# Patient Record
Sex: MALE | Race: WHITE | NOT HISPANIC OR LATINO | Employment: OTHER | URBAN - METROPOLITAN AREA
[De-identification: names, ages, dates, MRNs, and addresses within clinical notes are randomized per-mention and may not be internally consistent; named-entity substitution may affect disease eponyms.]

---

## 2017-07-18 ENCOUNTER — PATIENT MESSAGE (OUTPATIENT)
Dept: FAMILY MEDICINE | Facility: CLINIC | Age: 62
End: 2017-07-18

## 2017-07-18 ENCOUNTER — OFFICE VISIT (OUTPATIENT)
Dept: FAMILY MEDICINE | Facility: CLINIC | Age: 62
End: 2017-07-18
Payer: COMMERCIAL

## 2017-07-18 ENCOUNTER — HOSPITAL ENCOUNTER (OUTPATIENT)
Dept: RADIOLOGY | Facility: HOSPITAL | Age: 62
Discharge: HOME | End: 2017-07-18
Attending: FAMILY MEDICINE

## 2017-07-18 ENCOUNTER — LAB VISIT (OUTPATIENT)
Dept: LAB | Facility: HOSPITAL | Age: 62
End: 2017-07-18
Attending: FAMILY MEDICINE
Payer: COMMERCIAL

## 2017-07-18 VITALS
TEMPERATURE: 98 F | WEIGHT: 141 LBS | HEIGHT: 65 IN | HEART RATE: 65 BPM | OXYGEN SATURATION: 98 % | DIASTOLIC BLOOD PRESSURE: 60 MMHG | SYSTOLIC BLOOD PRESSURE: 96 MMHG | BODY MASS INDEX: 23.49 KG/M2 | RESPIRATION RATE: 16 BRPM

## 2017-07-18 DIAGNOSIS — Z72.0 TOBACCO ABUSE: ICD-10-CM

## 2017-07-18 DIAGNOSIS — Z00.00 ROUTINE GENERAL MEDICAL EXAMINATION AT A HEALTH CARE FACILITY: ICD-10-CM

## 2017-07-18 DIAGNOSIS — Z23 NEED FOR TDAP VACCINATION: ICD-10-CM

## 2017-07-18 DIAGNOSIS — Z12.11 SCREENING FOR COLON CANCER: ICD-10-CM

## 2017-07-18 DIAGNOSIS — Z11.59 NEED FOR HEPATITIS C SCREENING TEST: ICD-10-CM

## 2017-07-18 DIAGNOSIS — Z00.00 ROUTINE GENERAL MEDICAL EXAMINATION AT A HEALTH CARE FACILITY: Primary | ICD-10-CM

## 2017-07-18 LAB
ALBUMIN SERPL BCP-MCNC: 3.8 G/DL
ALP SERPL-CCNC: 60 U/L
ALT SERPL W/O P-5'-P-CCNC: 20 U/L
ANION GAP SERPL CALC-SCNC: 4 MMOL/L
AST SERPL-CCNC: 29 U/L
BASOPHILS # BLD AUTO: 0.03 K/UL
BASOPHILS NFR BLD: 0.5 %
BILIRUB SERPL-MCNC: 0.5 MG/DL
BUN SERPL-MCNC: 13 MG/DL
CALCIUM SERPL-MCNC: 9.4 MG/DL
CHLORIDE SERPL-SCNC: 111 MMOL/L
CHOLEST/HDLC SERPL: 4.3 {RATIO}
CO2 SERPL-SCNC: 27 MMOL/L
CREAT SERPL-MCNC: 0.9 MG/DL
DIFFERENTIAL METHOD: ABNORMAL
EOSINOPHIL # BLD AUTO: 0.2 K/UL
EOSINOPHIL NFR BLD: 3.1 %
ERYTHROCYTE [DISTWIDTH] IN BLOOD BY AUTOMATED COUNT: 13.5 %
EST. GFR  (AFRICAN AMERICAN): >60 ML/MIN/1.73 M^2
EST. GFR  (NON AFRICAN AMERICAN): >60 ML/MIN/1.73 M^2
GLUCOSE SERPL-MCNC: 114 MG/DL
HCT VFR BLD AUTO: 42.9 %
HDL/CHOLESTEROL RATIO: 23.4 %
HDLC SERPL-MCNC: 175 MG/DL
HDLC SERPL-MCNC: 41 MG/DL
HGB BLD-MCNC: 14.5 G/DL
LDLC SERPL CALC-MCNC: 123.2 MG/DL
LYMPHOCYTES # BLD AUTO: 2 K/UL
LYMPHOCYTES NFR BLD: 33.8 %
MCH RBC QN AUTO: 32 PG
MCHC RBC AUTO-ENTMCNC: 33.8 %
MCV RBC AUTO: 95 FL
MONOCYTES # BLD AUTO: 0.4 K/UL
MONOCYTES NFR BLD: 7.3 %
NEUTROPHILS # BLD AUTO: 3.2 K/UL
NEUTROPHILS NFR BLD: 55.3 %
NONHDLC SERPL-MCNC: 134 MG/DL
PLATELET # BLD AUTO: 144 K/UL
PMV BLD AUTO: 11.6 FL
POTASSIUM SERPL-SCNC: 5.1 MMOL/L
PROT SERPL-MCNC: 7.6 G/DL
RBC # BLD AUTO: 4.53 M/UL
SODIUM SERPL-SCNC: 142 MMOL/L
TRIGL SERPL-MCNC: 54 MG/DL
WBC # BLD AUTO: 5.77 K/UL

## 2017-07-18 PROCEDURE — 86803 HEPATITIS C AB TEST: CPT

## 2017-07-18 PROCEDURE — 84153 ASSAY OF PSA TOTAL: CPT

## 2017-07-18 PROCEDURE — 80053 COMPREHEN METABOLIC PANEL: CPT

## 2017-07-18 PROCEDURE — 80061 LIPID PANEL: CPT

## 2017-07-18 PROCEDURE — 99386 PREV VISIT NEW AGE 40-64: CPT | Mod: S$GLB,,, | Performed by: FAMILY MEDICINE

## 2017-07-18 PROCEDURE — 76497 UNLISTED CT PROCEDURE: CPT | Mod: TC

## 2017-07-18 PROCEDURE — 83036 HEMOGLOBIN GLYCOSYLATED A1C: CPT

## 2017-07-18 PROCEDURE — 99999 PR PBB SHADOW E&M-NEW PATIENT-LVL IV: CPT | Mod: PBBFAC,,, | Performed by: FAMILY MEDICINE

## 2017-07-18 PROCEDURE — 85025 COMPLETE CBC W/AUTO DIFF WBC: CPT

## 2017-07-18 PROCEDURE — 36415 COLL VENOUS BLD VENIPUNCTURE: CPT | Mod: PN

## 2017-07-18 NOTE — PROGRESS NOTES
"Well Man VISIT      CHIEF COMPLAINT  Chief Complaint   Patient presents with    Annual Exam       HPI  Evan Son is a 62 y.o. male who presents well man.  He speaks some English.  Discussed his health with his daughter, Jairo over the phone.  He declines any acute symptoms today.  He works fulltime.     Lives with wife, at home.    Social Factors  Tobacco use: Yes.1 ppd for last 30 years.  1 ppdx30= 30 pack years   Ready to Quit: No  Intimate partner violence screening  "Do you feel safe in your current relationship?" Yes  "Have you ever been in a relationship in which your partner frightened you or hurt you?" No  Regular Exercise: Yes - walking    Depression  Over the past two weeks, have you felt down, depressed, or hopeless? No  Over the past two weeks, have you felt little interest or pleasure in doing things? No    Reproductive Health  STD screening in last year: no    Screen for Chronic Disease  CHD Risk Factors: advanced age (older than 55 for men, 65 for women), male gender and smoking/ tobacco exposure  Estimated body mass index is 23.83 kg/m² as calculated from the following:    Height as of this encounter: 5' 4.5" (1.638 m).    Weight as of this encounter: 64 kg (141 lb).  Dyslipidemia screening needed: yes  T2DM screening needed: yes  Colonoscopy needed: yes  PSA needed: yes  AAA screening needed:yes  Screen men 35 years and older, and men 20 to 34 years of age who have cardiovascular risk factors for dyslipidemia  Begin screening colonoscopies at 50 years of age in men of average risk, and continue until 75 years of age; offer fecal occult blood testing every year, flexible sigmoidoscopy every five years combined with fecal occult blood testing every three years, or colonoscopy every 10 years   The American Urological Association recommends offering PSA testing and digital rectal examination to well-informed men beginning at 40 years of age and continuing until life expectancy is less than 10 " "years  Screen once with ultrasonography in men 65 to 75 years of age if they have a family history or have smoked at [id number] cigarettes in their lifetime  Screen men with a sustained blood pressure greater than 135/80 mm Hg for T2DM      Immunizations  up to date and documented. Declines pneumovax and Tdap.  Declines all vaccinations.    ALLERGIES and MEDS were verified.   PMHx, PSHx, FHx, SOCIALHx were updated as pertinent.    REVIEW OF SYSTEMS  Negative 5 point review of systems    PHYSICAL EXAM  VITAL SIGNS: BP 96/60 (BP Location: Right arm, Patient Position: Sitting, BP Method: Manual)   Pulse 65   Temp 97.7 °F (36.5 °C) (Oral)   Resp 16   Ht 5' 4.5" (1.638 m)   Wt 64 kg (141 lb)   SpO2 98%   BMI 23.83 kg/m²   GEN: Well developed, Well nourished, No acute distress.  HENT: Normocephalic, Atraumatic, Bilateral external ears normal, Nose normal, Oropharynx moist, No oral exudates.   Eyes: PERRLA, EOMI, Conjunctiva normal, No discharge.   Neck: Supple, No tenderness.  Lymphatic: No cervical or supraclavicular lymphadenopathy noted.   Cardiovascular: Normal heart rate, Normal rhythm, No murmurs, No rubs, No gallops.   Thorax & Lungs: Normal breath sounds, No respiratory distress, No wheezing.  Abdomen: Soft, No tenderness, Bowel sounds normal.  Genital: normal, deferred   Skin: Warm, Dry, No erythema, No rash.   Extremities: No edema, No tenderness.       ASSESSMENT/PLAN    Evan was seen today for annual exam.    Diagnoses and all orders for this visit:    Routine general medical examination at a health care facility  -     Hemoglobin A1c; Future  -     CBC auto differential; Future  -     PSA, Screening; Future  -     Comprehensive metabolic panel; Future  -     Lipid panel; Future    Screening for colon cancer  -     Case request GI: COLONOSCOPY    Need for hepatitis C screening test  -     Hepatitis C antibody; Future    Tobacco abuse  -     CT Chest Lung Screening Low Dose; Future    FOLLOW UP: PRN for " acute symptoms  Patient told to reach out to us regarding tobacco cessation. Also given number for [phone] quit now.    Answers for HPI/ROS submitted by the patient on 7/18/2017   activity change: No  unexpected weight change: No  neck pain: No  hearing loss: No  rhinorrhea: No  trouble swallowing: No  eye discharge: No  visual disturbance: No  chest tightness: No  wheezing: No  chest pain: No  palpitations: No  blood in stool: No  constipation: No  vomiting: No  diarrhea: No  polydipsia: No  polyuria: No  difficulty urinating: No  urgency: No  hematuria: No  joint swelling: No  arthralgias: No  headaches: No  weakness: No  confusion: No  dysphoric mood: No

## 2017-07-19 ENCOUNTER — PATIENT MESSAGE (OUTPATIENT)
Dept: FAMILY MEDICINE | Facility: CLINIC | Age: 62
End: 2017-07-19

## 2017-07-19 LAB
COMPLEXED PSA SERPL-MCNC: 2.3 NG/ML
ESTIMATED AVG GLUCOSE: 120 MG/DL
HBA1C MFR BLD HPLC: 5.8 %
HCV AB SERPL QL IA: NEGATIVE

## 2017-09-18 ENCOUNTER — TELEPHONE (OUTPATIENT)
Dept: UROLOGY | Facility: CLINIC | Age: 62
End: 2017-09-18

## 2017-09-18 NOTE — TELEPHONE ENCOUNTER
Patient states he speaks limited english and would like to see dr. Miller only. He would not tell me the reason for the visit. perico't confirmed for 10.2.2017 and mailed

## 2017-09-18 NOTE — TELEPHONE ENCOUNTER
----- Message from Di Piper sent at 9/18/2017  2:02 PM CDT -----  Contact: Pt:[phone]  Pt called and states he would like to speak with . Pt did not disclose reason.

## 2017-10-02 ENCOUNTER — OFFICE VISIT (OUTPATIENT)
Dept: UROLOGY | Facility: CLINIC | Age: 62
End: 2017-10-02
Payer: COMMERCIAL

## 2017-10-02 VITALS
SYSTOLIC BLOOD PRESSURE: 107 MMHG | WEIGHT: 144.19 LBS | BODY MASS INDEX: 24.02 KG/M2 | HEIGHT: 65 IN | DIASTOLIC BLOOD PRESSURE: 68 MMHG | HEART RATE: 79 BPM

## 2017-10-02 DIAGNOSIS — R39.12 WEAK URINE STREAM: Primary | ICD-10-CM

## 2017-10-02 DIAGNOSIS — N13.8 BPH WITH URINARY OBSTRUCTION: ICD-10-CM

## 2017-10-02 DIAGNOSIS — N52.9 ED (ERECTILE DYSFUNCTION) OF ORGANIC ORIGIN: ICD-10-CM

## 2017-10-02 DIAGNOSIS — N41.1 CHRONIC PROSTATITIS: ICD-10-CM

## 2017-10-02 DIAGNOSIS — N40.1 BPH WITH URINARY OBSTRUCTION: ICD-10-CM

## 2017-10-02 PROCEDURE — 87086 URINE CULTURE/COLONY COUNT: CPT

## 2017-10-02 PROCEDURE — 99205 OFFICE O/P NEW HI 60 MIN: CPT | Mod: 25,S$GLB,, | Performed by: UROLOGY

## 2017-10-02 PROCEDURE — 3008F BODY MASS INDEX DOCD: CPT | Mod: S$GLB,,, | Performed by: UROLOGY

## 2017-10-02 PROCEDURE — 99999 PR PBB SHADOW E&M-EST. PATIENT-LVL III: CPT | Mod: PBBFAC,,, | Performed by: UROLOGY

## 2017-10-02 RX ORDER — SILDENAFIL 100 MG/1
100 TABLET, FILM COATED ORAL DAILY PRN
Qty: 6 TABLET | Refills: 12 | Status: SHIPPED | OUTPATIENT
Start: 2017-10-02 | End: 2018-03-08

## 2017-10-02 RX ORDER — CIPROFLOXACIN 250 MG/1
500 TABLET, FILM COATED ORAL ONCE
Status: CANCELLED | OUTPATIENT
Start: 2017-10-02 | End: 2017-10-02

## 2017-10-02 RX ORDER — LIDOCAINE HYDROCHLORIDE 20 MG/ML
JELLY TOPICAL ONCE
Status: CANCELLED | OUTPATIENT
Start: 2017-10-02 | End: 2017-10-02

## 2017-10-02 RX ORDER — CIPROFLOXACIN 500 MG/1
500 TABLET ORAL 2 TIMES DAILY
Qty: 60 TABLET | Refills: 1 | Status: SHIPPED | OUTPATIENT
Start: 2017-10-02 | End: 2017-11-01

## 2017-10-02 RX ORDER — SILODOSIN 8 MG/1
8 CAPSULE ORAL NIGHTLY
Qty: 30 CAPSULE | Refills: 12 | Status: SHIPPED | OUTPATIENT
Start: 2017-10-02 | End: 2018-02-16

## 2017-10-02 NOTE — PROGRESS NOTES
CC: voiding trouble    Evan Son is a 62 y.o. man who is here for the evaluation of No chief complaint on file.  a new pt.  He is a Spanish with limited English speaking.  Hx of prostatitis back in 2009.  Since then, he often has experienced spraying or splitting urine flow.  Has been taking Flomax for the past 2 years.  C/o intermittency, weak urine flow, incomplete bladder emptying.    No family hx of prostate cancer.  Denies flank pain, dysuria, hematuria .      No past medical history on file.  No past surgical history on file.  Social History   Substance Use Topics    Smoking status: Current Every Day Smoker     Packs/day: 0.75     Types: Cigarettes    Smokeless tobacco: Never Used    Alcohol use No     No family history on file.  Allergy:  Review of patient's allergies indicates:  No Known Allergies  Outpatient Encounter Prescriptions as of 10/2/2017   Medication Sig Dispense Refill    ciprofloxacin HCl (CIPRO) 500 MG tablet Take 1 tablet (500 mg total) by mouth 2 (two) times daily. 60 tablet 1    polyethylene glycol (COLYTE) 240-22.72-6.72 -5.84 gram SolR Take 4,000 mLs (4 L total) by mouth as needed. 1 Bottle 0    RAPAFLO 8 mg Cap capsule Take 1 capsule (8 mg total) by mouth every evening. 30 capsule 12    sildenafil (VIAGRA) 100 MG tablet Take 1 tablet (100 mg total) by mouth daily as needed for Erectile Dysfunction. 6 tablet 12     No facility-administered encounter medications on file as of 10/2/2017.      Review of Systems   General ROS: GENERAL:  No weight gain or loss  SKIN:  No rashes or lacerations  HEAD:  No headaches  EYES:  No exophthalmos, jaundice or blindness  EARS:  No dizziness, tinnitus or hearing loss  NOSE:  No changes in smell  MOUTH & THROAT:  No dyskinetic movements or obvious goiter  CHEST:  No shortness of breath, hyperventilation or cough  CARDIOVASCULAR:  No tachycardia or chest pain  ABDOMEN:  No nausea, vomiting, pain, constipation or diarrhea  URINARY:  No frequency,  dysuria or sexual dysfunction  ENDOCRINE:  No polydipsia, polyuria  MUSCULOSKELETAL:  No pain or stiffness of the joints  NEUROLOGIC:  No weakness, sensory changes, seizures, confusion, memory loss, tremor or other abnormal movements  Physical Exam     Vitals:    10/02/17 1258   BP: 107/68   Pulse: 79     Physical Exam  Genitalia:  Scrotum: no rash or lesion  Normal symmetric epididymis without masses  Normal vas palpated  Normal size, symmetric testicles with no masses   Normal urethral meatus with no discharge  Normal circumcised penis with no lesion   Rectal:  Normal perineum and anus upon inspection.  Normal tone, no masses or tenderness;     LABS:  Lab Results   Component Value Date    PSA 2.3 07/18/2017     No results found for this or any previous visit.  Lab Results   Component Value Date    CREATININE 0.9 07/18/2017     No results found for this or any previous visit.  Urine Culture, Routine   Date Value Ref Range Status   04/03/2009   Final    >100,000 ORGANISMS/ML -ESCHERICHIA COLI  Amp/Sulbactam        >16/8      RESISTANT     Amikacin             <=16       SENSITIVE     Ampicillin           >16        RESISTANT     Amox/K Clav          <=8/4      SENSITIVE     Aztreonam            <=8        SENSITIVE     Ceftriaxone          <=8        SENSITIVE     Ceftazidime          <=1        SENSITIVE     Cephalothin          16         INTERMEDIATE  Cefotaxime           <=2        SENSITIVE     Cefazolin            <=8        SENSITIVE     Ciprofloxacin        <=1        SENSITIVE     Cefuroxime           <=4        SENSITIVE     Ertapenem            <=2        SENSITIVE     Nitrofurantoin       <=32       SENSITIVE     Gemifloxacin         <=0.25     SENSITIVE     Gentamicin           <=1        SENSITIVE     Imipenem             <=4        SENSITIVE     Levofloxacin         <=2        SENSITIVE     Pipercillin/ Tazobactam <=16       SENSITIVE     Bactrim              <=2/38     SENSITIVE     Tetracycline          <=4        SENSITIVE     Timentin             <=16       SENSITIVE     Tobramycin           <=2        SENSITIVE          UA clear    Positive EPS 3 to 5 WBCs on high power field    Assessment and Plan:  Diagnoses and all orders for this visit:    Weak urine stream  -     Cystoscopy; Future    BPH with urinary obstruction  -     Cystoscopy; Future    ED (erectile dysfunction) of organic origin  -     sildenafil (VIAGRA) 100 MG tablet; Take 1 tablet (100 mg total) by mouth daily as needed for Erectile Dysfunction.    Chronic prostatitis  -     Cystoscopy; Future  -     Urine culture  -     ciprofloxacin HCl (CIPRO) 500 MG tablet; Take 1 tablet (500 mg total) by mouth 2 (two) times daily.    Other orders  -     lidocaine HCl 2% urojet; Place into the urethra once.  -     ciprofloxacin HCl tablet 500 mg; Take 2 tablets (500 mg total) by mouth once.  -     RAPAFLO 8 mg Cap capsule; Take 1 capsule (8 mg total) by mouth every evening.    OK to stop Flomax and start Rapaflo.  cipro for 1 month.  May need TUMT or laser TURP if not improving on his overall urinary symptoms.  Cysto to rule out other pathology.    Follow-up:  Return for cysto.

## 2017-10-03 LAB — BACTERIA UR CULT: NO GROWTH

## 2017-10-24 ENCOUNTER — HOSPITAL ENCOUNTER (OUTPATIENT)
Dept: UROLOGY | Facility: HOSPITAL | Age: 62
Discharge: HOME | End: 2017-10-24
Attending: UROLOGY
Payer: COMMERCIAL

## 2017-10-24 VITALS
HEIGHT: 65 IN | WEIGHT: 143.75 LBS | HEART RATE: 96 BPM | TEMPERATURE: 98 F | BODY MASS INDEX: 23.95 KG/M2 | DIASTOLIC BLOOD PRESSURE: 72 MMHG | SYSTOLIC BLOOD PRESSURE: 131 MMHG | RESPIRATION RATE: 16 BRPM

## 2017-10-24 DIAGNOSIS — N40.1 BPH WITH URINARY OBSTRUCTION: ICD-10-CM

## 2017-10-24 DIAGNOSIS — N41.1 CHRONIC PROSTATITIS: ICD-10-CM

## 2017-10-24 DIAGNOSIS — R39.12 WEAK URINE STREAM: ICD-10-CM

## 2017-10-24 DIAGNOSIS — N13.8 BPH WITH URINARY OBSTRUCTION: ICD-10-CM

## 2017-10-24 PROCEDURE — 52000 CYSTOURETHROSCOPY: CPT | Mod: ,,, | Performed by: UROLOGY

## 2017-10-24 PROCEDURE — 52000 CYSTOURETHROSCOPY: CPT

## 2017-10-24 RX ORDER — CIPROFLOXACIN 500 MG/1
500 TABLET ORAL ONCE
Status: COMPLETED | OUTPATIENT
Start: 2017-10-24 | End: 2017-10-24

## 2017-10-24 RX ORDER — LIDOCAINE HYDROCHLORIDE 20 MG/ML
JELLY TOPICAL ONCE
Status: COMPLETED | OUTPATIENT
Start: 2017-10-24 | End: 2017-10-24

## 2017-10-24 RX ADMIN — LIDOCAINE HYDROCHLORIDE 10 ML: 20 JELLY TOPICAL at 08:10

## 2017-10-24 RX ADMIN — CIPROFLOXACIN 500 MG: 500 TABLET ORAL at 08:10

## 2017-10-24 NOTE — H&P (VIEW-ONLY)
CC: voiding trouble    Evan Son is a 62 y.o. man who is here for the evaluation of No chief complaint on file.  a new pt.  He is a English with limited English speaking.  Hx of prostatitis back in 2009.  Since then, he often has experienced spraying or splitting urine flow.  Has been taking Flomax for the past 2 years.  C/o intermittency, weak urine flow, incomplete bladder emptying.    No family hx of prostate cancer.  Denies flank pain, dysuria, hematuria .      No past medical history on file.  No past surgical history on file.  Social History   Substance Use Topics    Smoking status: Current Every Day Smoker     Packs/day: 0.75     Types: Cigarettes    Smokeless tobacco: Never Used    Alcohol use No     No family history on file.  Allergy:  Review of patient's allergies indicates:  No Known Allergies  Outpatient Encounter Prescriptions as of 10/2/2017   Medication Sig Dispense Refill    ciprofloxacin HCl (CIPRO) 500 MG tablet Take 1 tablet (500 mg total) by mouth 2 (two) times daily. 60 tablet 1    polyethylene glycol (COLYTE) 240-22.72-6.72 -5.84 gram SolR Take 4,000 mLs (4 L total) by mouth as needed. 1 Bottle 0    RAPAFLO 8 mg Cap capsule Take 1 capsule (8 mg total) by mouth every evening. 30 capsule 12    sildenafil (VIAGRA) 100 MG tablet Take 1 tablet (100 mg total) by mouth daily as needed for Erectile Dysfunction. 6 tablet 12     No facility-administered encounter medications on file as of 10/2/2017.      Review of Systems   General ROS: GENERAL:  No weight gain or loss  SKIN:  No rashes or lacerations  HEAD:  No headaches  EYES:  No exophthalmos, jaundice or blindness  EARS:  No dizziness, tinnitus or hearing loss  NOSE:  No changes in smell  MOUTH & THROAT:  No dyskinetic movements or obvious goiter  CHEST:  No shortness of breath, hyperventilation or cough  CARDIOVASCULAR:  No tachycardia or chest pain  ABDOMEN:  No nausea, vomiting, pain, constipation or diarrhea  URINARY:  No frequency,  dysuria or sexual dysfunction  ENDOCRINE:  No polydipsia, polyuria  MUSCULOSKELETAL:  No pain or stiffness of the joints  NEUROLOGIC:  No weakness, sensory changes, seizures, confusion, memory loss, tremor or other abnormal movements  Physical Exam     Vitals:    10/02/17 1258   BP: 107/68   Pulse: 79     Physical Exam  Genitalia:  Scrotum: no rash or lesion  Normal symmetric epididymis without masses  Normal vas palpated  Normal size, symmetric testicles with no masses   Normal urethral meatus with no discharge  Normal circumcised penis with no lesion   Rectal:  Normal perineum and anus upon inspection.  Normal tone, no masses or tenderness;     LABS:  Lab Results   Component Value Date    PSA 2.3 07/18/2017     No results found for this or any previous visit.  Lab Results   Component Value Date    CREATININE 0.9 07/18/2017     No results found for this or any previous visit.  Urine Culture, Routine   Date Value Ref Range Status   04/03/2009   Final    >100,000 ORGANISMS/ML -ESCHERICHIA COLI  Amp/Sulbactam        >16/8      RESISTANT     Amikacin             <=16       SENSITIVE     Ampicillin           >16        RESISTANT     Amox/K Clav          <=8/4      SENSITIVE     Aztreonam            <=8        SENSITIVE     Ceftriaxone          <=8        SENSITIVE     Ceftazidime          <=1        SENSITIVE     Cephalothin          16         INTERMEDIATE  Cefotaxime           <=2        SENSITIVE     Cefazolin            <=8        SENSITIVE     Ciprofloxacin        <=1        SENSITIVE     Cefuroxime           <=4        SENSITIVE     Ertapenem            <=2        SENSITIVE     Nitrofurantoin       <=32       SENSITIVE     Gemifloxacin         <=0.25     SENSITIVE     Gentamicin           <=1        SENSITIVE     Imipenem             <=4        SENSITIVE     Levofloxacin         <=2        SENSITIVE     Pipercillin/ Tazobactam <=16       SENSITIVE     Bactrim              <=2/38     SENSITIVE     Tetracycline          <=4        SENSITIVE     Timentin             <=16       SENSITIVE     Tobramycin           <=2        SENSITIVE          UA clear    Positive EPS 3 to 5 WBCs on high power field    Assessment and Plan:  Diagnoses and all orders for this visit:    Weak urine stream  -     Cystoscopy; Future    BPH with urinary obstruction  -     Cystoscopy; Future    ED (erectile dysfunction) of organic origin  -     sildenafil (VIAGRA) 100 MG tablet; Take 1 tablet (100 mg total) by mouth daily as needed for Erectile Dysfunction.    Chronic prostatitis  -     Cystoscopy; Future  -     Urine culture  -     ciprofloxacin HCl (CIPRO) 500 MG tablet; Take 1 tablet (500 mg total) by mouth 2 (two) times daily.    Other orders  -     lidocaine HCl 2% urojet; Place into the urethra once.  -     ciprofloxacin HCl tablet 500 mg; Take 2 tablets (500 mg total) by mouth once.  -     RAPAFLO 8 mg Cap capsule; Take 1 capsule (8 mg total) by mouth every evening.    OK to stop Flomax and start Rapaflo.  cipro for 1 month.  May need TUMT or laser TURP if not improving on his overall urinary symptoms.  Cysto to rule out other pathology.    Follow-up:  Return for cysto.

## 2017-10-24 NOTE — PROCEDURES
Procedure Date:  10/24/2017      Procedure:  Male Diagnostic Cystourethroscopy    Pre-op diagnosis: weak urine flow, BPH, possible prostattis  Post-op diagnosis: same, urethral diverticulum  Anesthesia: Local  Surgeon:  Matthew Miller MD    Findings:  Urethra:  Normal urethra with wide open mouth urethral diverticulum near the proximal bulbar urethra.   Sphincter: competent.  Prostate: Estimated Length Prostatic Urethra: 4 cm with trilobar obstruction  Bladder neck: patent with no stricture  Bladder:  Normal bladder.   Normal ureteral orifices bilaterally.   Moderate trabeculation.     Description of Procedure:                                                         Informed Consent:                                                            - Risks, benefits and alternatives of procedure discussed with               patient and informed consent obtained.       Patient Position:   - Supine. --- Bladder ---   Prep and Drape:   - Patient prepped and draped in usual sterile fashion using povidone     iodine (Betadine).   Instruments:   - 16 Fr flexible cystoscope with 0 degree lens.   Procedure Details:   - Cystoscope passed under vision into bladder.   - Bladder and urethra examined in their entirety with findings as     above.     Conclusion:  1. BPH with obstruciton  2. OAB  His OAB symptoms have improved on abx and rapaflo.  He was not able to tolerate cipro due to stomach irritation and stopped cipro after 3 wks treatment.    Recommend TUMT or laser TURP.  Detailed instruction given.  He understands abut was unable to decide which therapy he wants to undergo.  He will think about it and will let me know which therapy he decided on.    Plan:  Patient was discharged home in a stable condition.  Medications: cipro  Follow up:  prn

## 2017-10-24 NOTE — PATIENT INSTRUCTIONS
What to Expect After a Cystoscopy  For the next 24-48 hours, you may feel a mild burning when you urinate. This burning is normal and expected. Drink plenty of water to dilute the urine to help relieve the burning sensation. You may also see a small amount of blood in your urine after the procedure.    Unless you are already taking antibiotics, you may be given an antibiotic after the test to prevent infection.    Signs and Symptoms to Report  Call the [facility] at [phone] if you develop any of the following:  · Fever of 101 degrees or higher  · Chills or persistent bleeding  · Inability to urinate

## 2017-11-17 ENCOUNTER — TELEPHONE (OUTPATIENT)
Dept: UROLOGY | Facility: CLINIC | Age: 62
End: 2017-11-17

## 2017-11-17 DIAGNOSIS — Z01.818 PREOP EXAMINATION: Primary | ICD-10-CM

## 2017-11-17 DIAGNOSIS — N40.1 BPH WITH URINARY OBSTRUCTION: Primary | ICD-10-CM

## 2017-11-17 DIAGNOSIS — N13.8 BPH WITH URINARY OBSTRUCTION: Primary | ICD-10-CM

## 2017-11-17 NOTE — TELEPHONE ENCOUNTER
BPH with urinary obstruction  -     Case Request Operating Room: PROSTATECTOMY-TRANSURETHRAL W QUANTA LASER

## 2017-12-01 ENCOUNTER — ANESTHESIA EVENT (OUTPATIENT)
Dept: SURGERY | Facility: HOSPITAL | Age: 62
End: 2017-12-01
Payer: COMMERCIAL

## 2017-12-01 DIAGNOSIS — Z01.818 PREOP TESTING: Primary | ICD-10-CM

## 2017-12-01 NOTE — ANESTHESIA PREPROCEDURE EVALUATION
Anesthesia Assessment: Preoperative EQUATION     Planned Procedure: Procedure(s) (LRB):  PROSTATECTOMY-TRANSURETHRAL W QUANTA LASER (N/A)  Requested Anesthesia Type:General  Surgeon: Matthew Miller MD  Service: Urology  Known or anticipated Date of Surgery:12/13/2017     Surgeon notes: reviewed     Electronic QUestionnaire Assessment completed via nurse interview with patient.         No aq           Triage considerations:      The patient has no apparent active cardiac condition (No unstable coronary Syndrome such as severe unstable angina or recent [<1 month] myocardial infarction, decompensated CHF, severe valvular   disease or significant arrhythmia)     Previous anesthesia records:Not available     Last PCP note: within [facility] Dr. Emanuel  7/18/17     Other important co-morbidities:   Tobacco abuse  bph  ED  Chronic prostatitis     Tests already available:  No recent tests.                            Instructions given. (See in Nurse's note)     Optimization:                                       Plan:    Testing:  Hematology Profile and CMP                          Straight line to surgery  Navigation: Tests Scheduled.   62 yr old Yi mihaela speaks some english, daughter Jairo speaks English. Denies any anesthesia issues, no respiratory problems or heart issues.                                                                                                                                                   12/01/2017  Evan Son is a 62 y.o., male.  Pre-operative evaluation for PROSTATECTOMY-TRANSURETHRAL W QUANTA LASER (N/A)    Chief Complaint: urinary obstruction    PMH:  BPH  Tobacco abuse-recent chest CT scan  r/o lung CA and wnl    History reviewed. No pertinent surgical history.  Endoscopy -no problems with anesthesia    Vital Signs Range (Last 24H):         CBC:     Recent Labs  Lab 12/06/17  0800   WBC 5.91   RBC 4.39*   HGB 14.1   HCT 42.1   *   MCV 96   MCH 32.1*   MCHC 33.5       CMP:    Recent Labs  Lab 17  0800      K 4.4   *   CO2 24   BUN 19   CREATININE 0.8   *   CALCIUM 9.0   ALBUMIN 3.6   PROT 7.2   ALKPHOS 59   ALT 25   AST 28   BILITOT 0.5       INR:  No results for input(s): PT, INR, PROTIME, APTT in the last 720 hours.      Diagnostic Studies:      EKD Echo:    Anesthesia Evaluation    I have reviewed the Patient Summary Reports.    I have reviewed the Nursing Notes.   I have reviewed the Medications.     Review of Systems  Anesthesia Hx:  No problems with previous Anesthesia Hx of Anesthetic complications  History of prior surgery of interest to airway management or planning: Previous anesthesia: General ? arm surgery in past with general anesthesia.    Social:  Smoker, Social Alcohol Use    Hematology/Oncology:  Hematology Normal   Oncology Normal     EENT/Dental:EENT/Dental Normal   Cardiovascular:  Cardiovascular Normal  Hyperlipidemia: 18 cigarettes per day.    Pulmonary:  Pulmonary Normal    Renal/:   BPH BPH with obstruction   Hepatic/GI:  Hepatic/GI Normal    Musculoskeletal:  Musculoskeletal Normal    Neurological:  Neurology Normal    Endocrine:  Endocrine Normal    Dermatological:  Skin Normal    Psych:  Psychiatric Normal           Physical Exam  General:  Well nourished    Airway/Jaw/Neck:  Airway Findings: Mouth Opening: Normal Tongue: Normal  General Airway Assessment: Good  Mallampati: I  TM Distance: Normal, at least 6 cm  Jaw/Neck Findings:  Neck ROM: Normal ROM      Dental:  Dental Findings: In tact   Chest/Lungs:  Chest/Lungs Findings: Clear to auscultation, Normal Respiratory Rate     Heart/Vascular:  Heart Findings: Rate: Normal  Rhythm: Regular Rhythm  Sounds: Normal        Mental Status:  Mental Status Findings:  Cooperative, Alert and Oriented         Anesthesia Plan  Type of Anesthesia, risks & benefits discussed:  Anesthesia Type:  general  Patient's Preference:   Intra-op Monitoring Plan:   Intra-op Monitoring Plan  Comments:   Post Op Pain Control Plan:   Post Op Pain Control Plan Comments:   Induction:   IV  Beta Blocker:  Patient is not currently on a Beta-Blocker (No further documentation required).       Informed Consent: Patient understands risks and agrees with Anesthesia plan.  Questions answered. Anesthesia consent signed with patient.  ASA Score: 2     Day of Surgery Review of History & Physical:    H&P update referred to the surgeon.         Ready For Surgery From Anesthesia Perspective.

## 2017-12-01 NOTE — PRE ADMISSION SCREENING
Anesthesia Assessment: Preoperative EQUATION    Planned Procedure: Procedure(s) (LRB):  PROSTATECTOMY-TRANSURETHRAL W QUANTA LASER (N/A)  Requested Anesthesia Type:General  Surgeon: Matthew Miller MD  Service: Urology  Known or anticipated Date of Surgery:12/13/2017    Surgeon notes: reviewed    Electronic QUestionnaire Assessment completed via nurse interview with patient.        No aq        Triage considerations:     The patient has no apparent active cardiac condition (No unstable coronary Syndrome such as severe unstable angina or recent [<1 month] myocardial infarction, decompensated CHF, severe valvular   disease or significant arrhythmia)    Previous anesthesia records:Not available    [facility]    Other important co-morbidities:   Tobacco abuse  bph  ED  Chronic prostatitis     Tests already available:  No recent tests.            Instructions given. (See in Nurse's note)    Optimization:           Plan:    Testing:  Hematology Profile and CMP    Straight line to surgery  Navigation: Tests Scheduled.   62 yr old [place] mihaela speaks some english, daughter Jairo speaks English. Denies any anesthesia issues, no respiratory problems or heart issues.

## 2017-12-05 DIAGNOSIS — Z01.818 PREOP TESTING: Primary | ICD-10-CM

## 2017-12-06 ENCOUNTER — LAB VISIT (OUTPATIENT)
Dept: LAB | Facility: HOSPITAL | Age: 62
End: 2017-12-06
Attending: ANESTHESIOLOGY
Payer: COMMERCIAL

## 2017-12-06 DIAGNOSIS — Z01.818 PREOP TESTING: ICD-10-CM

## 2017-12-06 LAB
ALBUMIN SERPL BCP-MCNC: 3.6 G/DL
ALP SERPL-CCNC: 59 U/L
ALT SERPL W/O P-5'-P-CCNC: 25 U/L
ANION GAP SERPL CALC-SCNC: 6 MMOL/L
AST SERPL-CCNC: 28 U/L
BILIRUB SERPL-MCNC: 0.5 MG/DL
BUN SERPL-MCNC: 19 MG/DL
CALCIUM SERPL-MCNC: 9 MG/DL
CHLORIDE SERPL-SCNC: 111 MMOL/L
CO2 SERPL-SCNC: 24 MMOL/L
CREAT SERPL-MCNC: 0.8 MG/DL
ERYTHROCYTE [DISTWIDTH] IN BLOOD BY AUTOMATED COUNT: 13.3 %
EST. GFR  (AFRICAN AMERICAN): >60 ML/MIN/1.73 M^2
EST. GFR  (NON AFRICAN AMERICAN): >60 ML/MIN/1.73 M^2
GLUCOSE SERPL-MCNC: 112 MG/DL
HCT VFR BLD AUTO: 42.1 %
HGB BLD-MCNC: 14.1 G/DL
MCH RBC QN AUTO: 32.1 PG
MCHC RBC AUTO-ENTMCNC: 33.5 G/DL
MCV RBC AUTO: 96 FL
PLATELET # BLD AUTO: 139 K/UL
PMV BLD AUTO: 12 FL
POTASSIUM SERPL-SCNC: 4.4 MMOL/L
PROT SERPL-MCNC: 7.2 G/DL
RBC # BLD AUTO: 4.39 M/UL
SODIUM SERPL-SCNC: 141 MMOL/L
WBC # BLD AUTO: 5.91 K/UL

## 2017-12-06 PROCEDURE — 36415 COLL VENOUS BLD VENIPUNCTURE: CPT | Mod: PO

## 2017-12-06 PROCEDURE — 85027 COMPLETE CBC AUTOMATED: CPT

## 2017-12-06 PROCEDURE — 80053 COMPREHEN METABOLIC PANEL: CPT

## 2017-12-12 ENCOUNTER — TELEPHONE (OUTPATIENT)
Dept: UROLOGY | Facility: CLINIC | Age: 62
End: 2017-12-12

## 2017-12-12 NOTE — TELEPHONE ENCOUNTER
Called pt's cousin to confirm 6:30am arrival time for procedure. Gave pt's cousin NPO instructions and gave pt's cousin opportunity to ask questions. Pt's cousin verbalized understanding.

## 2017-12-13 ENCOUNTER — HOSPITAL ENCOUNTER (OUTPATIENT)
Facility: HOSPITAL | Age: 62
Discharge: HOME | End: 2017-12-13
Attending: UROLOGY | Admitting: UROLOGY
Payer: COMMERCIAL

## 2017-12-13 ENCOUNTER — ANESTHESIA (OUTPATIENT)
Dept: SURGERY | Facility: HOSPITAL | Age: 62
End: 2017-12-13
Payer: COMMERCIAL

## 2017-12-13 VITALS
HEART RATE: 64 BPM | RESPIRATION RATE: 16 BRPM | TEMPERATURE: 98 F | BODY MASS INDEX: 24.59 KG/M2 | OXYGEN SATURATION: 100 % | SYSTOLIC BLOOD PRESSURE: 115 MMHG | HEIGHT: 64 IN | DIASTOLIC BLOOD PRESSURE: 57 MMHG | WEIGHT: 144 LBS

## 2017-12-13 DIAGNOSIS — Z01.818 PRE-OPERATIVE CLEARANCE: ICD-10-CM

## 2017-12-13 DIAGNOSIS — Z01.818 PREOP TESTING: ICD-10-CM

## 2017-12-13 DIAGNOSIS — N13.8 BPH WITH URINARY OBSTRUCTION: Primary | ICD-10-CM

## 2017-12-13 DIAGNOSIS — N40.1 BPH WITH URINARY OBSTRUCTION: Primary | ICD-10-CM

## 2017-12-13 PROCEDURE — 71000033 HC RECOVERY, INTIAL HOUR: Performed by: UROLOGY

## 2017-12-13 PROCEDURE — 25000003 PHARM REV CODE 250: Performed by: UROLOGY

## 2017-12-13 PROCEDURE — 27200651 HC AIRWAY, LMA: Performed by: NURSE ANESTHETIST, CERTIFIED REGISTERED

## 2017-12-13 PROCEDURE — 25000003 PHARM REV CODE 250: Performed by: NURSE ANESTHETIST, CERTIFIED REGISTERED

## 2017-12-13 PROCEDURE — 52648 LASER SURGERY OF PROSTATE: CPT | Mod: ,,, | Performed by: UROLOGY

## 2017-12-13 PROCEDURE — 63600175 PHARM REV CODE 636 W HCPCS: Performed by: NURSE ANESTHETIST, CERTIFIED REGISTERED

## 2017-12-13 PROCEDURE — 71000015 HC POSTOP RECOV 1ST HR: Performed by: UROLOGY

## 2017-12-13 PROCEDURE — 36000707: Performed by: UROLOGY

## 2017-12-13 PROCEDURE — 37000008 HC ANESTHESIA 1ST 15 MINUTES: Performed by: UROLOGY

## 2017-12-13 PROCEDURE — D9220A PRA ANESTHESIA: Mod: CRNA,,, | Performed by: NURSE ANESTHETIST, CERTIFIED REGISTERED

## 2017-12-13 PROCEDURE — 36000706: Performed by: UROLOGY

## 2017-12-13 PROCEDURE — 25000003 PHARM REV CODE 250

## 2017-12-13 PROCEDURE — 37000009 HC ANESTHESIA EA ADD 15 MINS: Performed by: UROLOGY

## 2017-12-13 PROCEDURE — D9220A PRA ANESTHESIA: Mod: ANES,,, | Performed by: ANESTHESIOLOGY

## 2017-12-13 PROCEDURE — 71000039 HC RECOVERY, EACH ADD'L HOUR: Performed by: UROLOGY

## 2017-12-13 PROCEDURE — 63600175 PHARM REV CODE 636 W HCPCS: Performed by: ANESTHESIOLOGY

## 2017-12-13 PROCEDURE — 63600175 PHARM REV CODE 636 W HCPCS: Performed by: STUDENT IN AN ORGANIZED HEALTH CARE EDUCATION/TRAINING PROGRAM

## 2017-12-13 PROCEDURE — 25000003 PHARM REV CODE 250: Performed by: STUDENT IN AN ORGANIZED HEALTH CARE EDUCATION/TRAINING PROGRAM

## 2017-12-13 RX ORDER — LIDOCAINE HYDROCHLORIDE 10 MG/ML
1 INJECTION, SOLUTION EPIDURAL; INFILTRATION; INTRACAUDAL; PERINEURAL ONCE
Status: COMPLETED | OUTPATIENT
Start: 2017-12-13 | End: 2017-12-13

## 2017-12-13 RX ORDER — METOCLOPRAMIDE HYDROCHLORIDE 5 MG/ML
10 INJECTION INTRAMUSCULAR; INTRAVENOUS EVERY 10 MIN PRN
Status: DISCONTINUED | OUTPATIENT
Start: 2017-12-13 | End: 2017-12-13 | Stop reason: HOSPADM

## 2017-12-13 RX ORDER — GLYCOPYRROLATE 0.2 MG/ML
INJECTION INTRAMUSCULAR; INTRAVENOUS
Status: DISCONTINUED | OUTPATIENT
Start: 2017-12-13 | End: 2017-12-13

## 2017-12-13 RX ORDER — DEXAMETHASONE SODIUM PHOSPHATE 4 MG/ML
INJECTION, SOLUTION INTRA-ARTICULAR; INTRALESIONAL; INTRAMUSCULAR; INTRAVENOUS; SOFT TISSUE
Status: DISCONTINUED | OUTPATIENT
Start: 2017-12-13 | End: 2017-12-13

## 2017-12-13 RX ORDER — OXYCODONE AND ACETAMINOPHEN 5; 325 MG/1; MG/1
1 TABLET ORAL EVERY 4 HOURS PRN
Qty: 21 TABLET | Refills: 0 | Status: SHIPPED | OUTPATIENT
Start: 2017-12-13

## 2017-12-13 RX ORDER — LIDOCAINE HCL/PF 100 MG/5ML
SYRINGE (ML) INTRAVENOUS
Status: DISCONTINUED | OUTPATIENT
Start: 2017-12-13 | End: 2017-12-13

## 2017-12-13 RX ORDER — FENTANYL CITRATE 50 UG/ML
INJECTION, SOLUTION INTRAMUSCULAR; INTRAVENOUS
Status: DISCONTINUED | OUTPATIENT
Start: 2017-12-13 | End: 2017-12-13

## 2017-12-13 RX ORDER — OXYCODONE AND ACETAMINOPHEN 5; 325 MG/1; MG/1
TABLET ORAL
Status: COMPLETED
Start: 2017-12-13 | End: 2017-12-13

## 2017-12-13 RX ORDER — MIDAZOLAM HYDROCHLORIDE 1 MG/ML
INJECTION INTRAMUSCULAR; INTRAVENOUS
Status: DISCONTINUED | OUTPATIENT
Start: 2017-12-13 | End: 2017-12-13

## 2017-12-13 RX ORDER — PHENYLEPHRINE HYDROCHLORIDE 10 MG/ML
INJECTION INTRAVENOUS
Status: DISCONTINUED | OUTPATIENT
Start: 2017-12-13 | End: 2017-12-13

## 2017-12-13 RX ORDER — FENTANYL CITRATE 50 UG/ML
25 INJECTION, SOLUTION INTRAMUSCULAR; INTRAVENOUS EVERY 5 MIN PRN
Status: COMPLETED | OUTPATIENT
Start: 2017-12-13 | End: 2017-12-13

## 2017-12-13 RX ORDER — ONDANSETRON 2 MG/ML
INJECTION INTRAMUSCULAR; INTRAVENOUS
Status: DISCONTINUED | OUTPATIENT
Start: 2017-12-13 | End: 2017-12-13

## 2017-12-13 RX ORDER — PROPOFOL 10 MG/ML
VIAL (ML) INTRAVENOUS
Status: DISCONTINUED | OUTPATIENT
Start: 2017-12-13 | End: 2017-12-13

## 2017-12-13 RX ORDER — OXYCODONE AND ACETAMINOPHEN 5; 325 MG/1; MG/1
1 TABLET ORAL EVERY 4 HOURS PRN
Status: DISCONTINUED | OUTPATIENT
Start: 2017-12-13 | End: 2017-12-13 | Stop reason: HOSPADM

## 2017-12-13 RX ORDER — SODIUM CHLORIDE 9 MG/ML
INJECTION, SOLUTION INTRAVENOUS CONTINUOUS
Status: DISCONTINUED | OUTPATIENT
Start: 2017-12-13 | End: 2017-12-13 | Stop reason: HOSPADM

## 2017-12-13 RX ORDER — CEFAZOLIN SODIUM 2 G/50ML
2 SOLUTION INTRAVENOUS
Status: COMPLETED | OUTPATIENT
Start: 2017-12-13 | End: 2017-12-13

## 2017-12-13 RX ADMIN — FENTANYL CITRATE 25 MCG: 50 INJECTION, SOLUTION INTRAMUSCULAR; INTRAVENOUS at 08:12

## 2017-12-13 RX ADMIN — PHENYLEPHRINE HYDROCHLORIDE 100 MCG: 10 INJECTION INTRAVENOUS at 09:12

## 2017-12-13 RX ADMIN — LIDOCAINE HYDROCHLORIDE 50 MG: 20 INJECTION, SOLUTION INTRAVENOUS at 08:12

## 2017-12-13 RX ADMIN — PROPOFOL 170 MG: 10 INJECTION, EMULSION INTRAVENOUS at 08:12

## 2017-12-13 RX ADMIN — GLYCOPYRROLATE 0.2 MG: 0.2 INJECTION, SOLUTION INTRAMUSCULAR; INTRAVENOUS at 08:12

## 2017-12-13 RX ADMIN — OXYCODONE HYDROCHLORIDE AND ACETAMINOPHEN 1 TABLET: 5; 325 TABLET ORAL at 01:12

## 2017-12-13 RX ADMIN — SODIUM CHLORIDE: 0.9 INJECTION, SOLUTION INTRAVENOUS at 07:12

## 2017-12-13 RX ADMIN — LIDOCAINE HYDROCHLORIDE 1 MG: 10 INJECTION, SOLUTION EPIDURAL; INFILTRATION; INTRACAUDAL; PERINEURAL at 07:12

## 2017-12-13 RX ADMIN — FENTANYL CITRATE 25 MCG: 50 INJECTION, SOLUTION INTRAMUSCULAR; INTRAVENOUS at 11:12

## 2017-12-13 RX ADMIN — ONDANSETRON 4 MG: 2 INJECTION INTRAMUSCULAR; INTRAVENOUS at 09:12

## 2017-12-13 RX ADMIN — DEXAMETHASONE SODIUM PHOSPHATE 4 MG: 4 INJECTION, SOLUTION INTRAMUSCULAR; INTRAVENOUS at 08:12

## 2017-12-13 RX ADMIN — FENTANYL CITRATE 25 MCG: 50 INJECTION, SOLUTION INTRAMUSCULAR; INTRAVENOUS at 10:12

## 2017-12-13 RX ADMIN — CEFAZOLIN SODIUM 2 G: 2 SOLUTION INTRAVENOUS at 08:12

## 2017-12-13 RX ADMIN — SODIUM CHLORIDE, SODIUM GLUCONATE, SODIUM ACETATE, POTASSIUM CHLORIDE, MAGNESIUM CHLORIDE, SODIUM PHOSPHATE, DIBASIC, AND POTASSIUM PHOSPHATE: .53; .5; .37; .037; .03; .012; .00082 INJECTION, SOLUTION INTRAVENOUS at 09:12

## 2017-12-13 RX ADMIN — MIDAZOLAM HYDROCHLORIDE 2 MG: 1 INJECTION, SOLUTION INTRAMUSCULAR; INTRAVENOUS at 07:12

## 2017-12-13 RX ADMIN — OXYCODONE HYDROCHLORIDE AND ACETAMINOPHEN 1 TABLET: 5; 325 TABLET ORAL at 10:12

## 2017-12-13 NOTE — H&P
Urology ([facility]) H&P  Staff: Matthew Miller MD    HPI:  Evan Shah is a 62 y.o. Yoruba speaking male who has LUTS. He is complaining of intermittency, weak FOS, and incomplete bladder emptying. No dysuria, hematuria or flank pain. He has been on Rapaflo with limited results. He has had a UTI years ago in 2009. 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 He has elected to undergo laser TURP.     ROS:  Neg except per HPI    Past Medical History:   Diagnosis Date    BPH with obstruction/lower urinary tract symptoms     ED (erectile dysfunction)     Smoker        History reviewed. No pertinent surgical history.    Social History     Social History    Marital status: Single     Spouse name: N/A    Number of children: N/A    Years of education: N/A     Social History Main Topics    Smoking status: Current Every Day Smoker     Packs/day: 0.75     Types: Cigarettes    Smokeless tobacco: Never Used    Alcohol use No    Drug use: No    Sexual activity: Not Asked     Other Topics Concern    None     Social History Narrative    None       History reviewed. No pertinent family history.    Review of patient's allergies indicates:  No Known Allergies    No current facility-administered medications on file prior to encounter.      Current Outpatient Prescriptions on File Prior to Encounter   Medication Sig Dispense Refill    RAPAFLO 8 mg Cap capsule Take 1 capsule (8 mg total) by mouth every evening. 30 capsule 12    polyethylene glycol (COLYTE) 240-22.72-6.72 -5.84 gram SolR Take 4,000 mLs (4 L total) by mouth as needed. 1 Bottle 0    sildenafil (VIAGRA) 100 MG tablet Take 1 tablet (100 mg total) by mouth daily as needed for Erectile Dysfunction. 6 tablet 12       Anticoagulation:  No    Physical Exam:    AAOx4, NAD, WDWN  NC/AT, EOMI, PER, sclerae anicteric, speech normal, tongue midline  Nl effort, CTAB  RRR  Soft, non-tender,  non-distended    Labs:    Urine dipstick today - negative for all components    Lab Results   Component Value Date    WBC 5.91 12/06/2017    HGB 14.1 12/06/2017    HCT 42.1 12/06/2017    MCV 96 12/06/2017     (L) 12/06/2017       BMP  Lab Results   Component Value Date     12/06/2017    K 4.4 12/06/2017     (H) 12/06/2017    CO2 24 12/06/2017    BUN 19 12/06/2017    CREATININE 0.8 12/06/2017    CALCIUM 9.0 12/06/2017    ANIONGAP 6 (L) 12/06/2017    ESTGFRAFRICA >60.0 12/06/2017    EGFRNONAA >60.0 12/06/2017       Lab Results   Component Value Date    PSA 2.3 07/18/2017       Assessment: Evan Shah is a 62 y.o. male with LUTS refractory to medical management    Plan:     1. To OR today for laser TURP  2. Consents signed   3. I have explained the risk, benefits, and alternatives of the procedure in detail. The patient voices understanding and all questions have been answered. The patient agrees to proceed as planned.       Dariel Winston MD

## 2017-12-13 NOTE — INTERVAL H&P NOTE
The patient has been examined and the H&P has been reviewed:    I concur with the findings and no changes have occurred since H&P was written.    Anesthesia/Surgery risks, benefits and alternative options discussed and understood by patient/family.          Active Hospital Problems    Diagnosis  POA    BPH with urinary obstruction [N40.1, N13.8]  Yes      Resolved Hospital Problems    Diagnosis Date Resolved POA   No resolved problems to display.

## 2017-12-13 NOTE — ANESTHESIA POSTPROCEDURE EVALUATION
"Anesthesia Post Evaluation    Patient: Evan Shah    Procedure(s) Performed: Procedure(s) (LRB):  PROSTATECTOMY-TRANSURETHRAL W QUANTA LASER (N/A)    Final Anesthesia Type: general  Patient location during evaluation: PACU  Patient participation: Yes- Able to Participate  Level of consciousness: awake and alert and oriented  Post-procedure vital signs: reviewed and stable  Pain management: adequate  Airway patency: patent  PONV status at discharge: nausea (controlled) and vomiting (controlled)  Anesthetic complications: no      Cardiovascular status: hemodynamically stable  Respiratory status: unassisted  Hydration status: euvolemic  Follow-up not needed.        Visit Vitals  BP (!) 106/58   Pulse 76   Temp 36.5 °C (97.7 °F) (Temporal)   Resp 16   Ht 5' 4" (1.626 m)   Wt 65.3 kg (144 lb)   SpO2 99%   BMI 24.72 kg/m²       Pain/Noelle Score: Pain Assessment Performed: Yes (12/13/2017 10:47 AM)  Presence of Pain: complains of pain/discomfort (12/13/2017 10:47 AM)  Pain Rating Prior to Med Admin: 6 (12/13/2017 11:06 AM)  Pain Rating Post Med Admin: 6 (12/13/2017 10:47 AM)      "

## 2017-12-13 NOTE — H&P (VIEW-ONLY)
Anesthesia Assessment: Preoperative EQUATION    Planned Procedure: Procedure(s) (LRB):  PROSTATECTOMY-TRANSURETHRAL W QUANTA LASER (N/A)  Requested Anesthesia Type:General  Surgeon: Matthew Miller MD  Service: Urology  Known or anticipated Date of Surgery:12/13/2017    Surgeon notes: reviewed    Electronic QUestionnaire Assessment completed via nurse interview with patient.        No aq        Triage considerations:     The patient has no apparent active cardiac condition (No unstable coronary Syndrome such as severe unstable angina or recent [<1 month] myocardial infarction, decompensated CHF, severe valvular   disease or significant arrhythmia)    Previous anesthesia records:Not available    [facility]    Other important co-morbidities:   Tobacco abuse  bph  ED  Chronic prostatitis     Tests already available:  No recent tests.            Instructions given. (See in Nurse's note)    Optimization:           Plan:    Testing:  Hematology Profile and CMP    Straight line to surgery  Navigation: Tests Scheduled.   62 yr old [place]ive[place] mihaela speaks some english, daughter Jairo speaks English. Denies any anesthesia issues, no respiratory problems or heart issues.

## 2017-12-13 NOTE — DISCHARGE INSTRUCTIONS
Transurethral Resection of the Prostate (TURP): Home Recovery  Take it easy for the first month or so while you heal after transurethral resection of the prostate. During the first few weeks, you may feel burning when you pass urine. You may also feel like you have to urinate often. These sensations will go away. If your urine becomes bright red, it means that the treated area is bleeding. This may happen on and off for a month or so after a TURP. If this occurs, rest and drink plenty of fluids until the bleeding stops.    To get the best results from your laser prostatectomy, follow your doctors instructions and keep your follow-up appointments.    After your procedure  Your prostate will likely be sore at first. This will improve as you heal. Here are some things you can expect:  · You will be sent home with a catheter to drain urine from your bladder. If so, you may wear a leg bag during the day. The catheter will allow the surgery area to heal and help you avoid painful urination.  · Your doctor may also prescribe antibiotics to prevent infection and pain medication to ease any discomfort.  · On Friday, youll visit the doctor to have your catheter removed. If swelling still makes urination difficult, the catheter may be left in for another week. After the catheter is removed, you may need to urinate more often. This is normal and should get better with time.    Healing  For the first few weeks after your surgery, you may notice that your urine is cloudy or that you have blood or blood clots in your urine. This is normal while your body rids itself of the treated tissue. These symptoms may begin to improve during the first few weeks, but it may take up to 3 months before they go away. Your doctor can tell you when you can resume sexual activity and how soon you can return to work.    Special instructions  You may be told to:  · Avoid certain activities, such as sex, driving, and strenuous exercise. Talk to  your doctor about when you can resume these activities.  · Avoid lifting anything over 10 pounds and avoid bending over to lift things from the ground.  · Drink plenty of fluids to flush out your bladder    While you are healing  To help prevent problems during the first month after your surgery, follow these tips:  · Drink plenty of fluids.  · Avoid strenuous exercise.  · Dont lift anything over 10 pounds.  · Avoid sexual activity and strenuous exercise.  · Avoid straining at stool. If you are constipated, take stool softeners or laxatives for a few days.  · Talk to your doctor about when you can return to work.  · Ask your doctor when you can start driving again.  · Dont sit for more than 60 minutes without getting up.  · Check with your doctor before taking over-the-counter pain relievers. These include aspirin, ibuprofen, and naproxen.  .  Getting back to sex  You may be glad to know that BPH and its treatments rarely cause problems with sex. Even if you have retrograde ejaculation, orgasm shouldnt feel any different than it did before the procedure. Retrograde ejaculation happens when semen goes into the bladder instead of the urethra during ejaculation. This is common after surgery for BPH. This should not cause any health problems or affect your sexual function. If you notice any problems with sex, talk to your doctor. Help may be available.      Trouble controlling your urine  Some men will have trouble controlling their urine (urinary incontinence) after this surgery. This may last for a few days, weeks, or months, but it will improve with time. You may also pass your urine more often (urinary frequency), like you did before the surgery. This will also improve as you start to heal.    When to call your healthcare provider  Contact your healthcare provider right away if:  · You have a fever of 100.4°F (38°C) or higher, or as directed by your healthcare provider  · You have excessive bleeding  · You have  pain not relieved by medicine  · You notice that no urine is draining from the catheter or if the catheter falls out  · You have frequent or excessive urge to urinate  · Notice a decrease in urine flow in the catheter and feel a strong urge to urinate despite having the catheter  · You have bleeding with clots, or blood plugs up the catheter. (A little blood in the urine is normal.)  · Youre not able to urinate, or notice a decrease in urine flow after the catheter is removed on Friday   ·        Date Last Reviewed: 10/23/2013  © 9391-9045 [employer]. [address] [place] [address]. All rights reserved. This information is not intended as a substitute for professional medical care. Always follow your healthcare professional's instructions.    Indwelling Catheter Care   PATIENT EDUCATION:    1. Wash hands before and after handling the catheter.  2. Wash around urinary opening daily, taking care to avoid pulling on the catheter during cleansing.  3. Drink 8-12 glasses of fluids daily; increase fluid intake if urine becomes dark and concentrated.  4. Wipe all connecting junctions with alcohol or betadine before changing from leg-bag drainage to overnight bag drainage.  5. Keep the drainage bag at a lower level than the bladder; do not place the bag on your chair.  6. Avoid letting the bag lay on its side as urine may flow back into the drainage tube.  7. Usually the catheter is not changed except when blocked or a malfunction occurs.  8. Use the large standard drainage bag at night and switch to the leg bag during the day.  Do not sleep with the leg bag.     **MALE**  If you are uncircumcised, pull skin back over glans (head) of penis. Cleanse from the catheter outward to include entire glans. After cleansing return foreskin to its normal position. Continue with cleansing the rest of your genitalia, without returning to this glans area.    BATHING: You may take a shower. Do not soak in a  tub.    FOR EMERGENCIES:  If any unusual problems or difficulties occur, contact  Matthew Miller at the Clinic office, [phone] or the urology resident at [phone] (page ) or

## 2017-12-13 NOTE — DISCHARGE SUMMARY
[facility]  Discharge Note  Short Stay    Admit Date: 12/13/2017    Discharge Date and Time: 12/13/2017 10:03 AM     Attending Physician: Matthew Miller MD     Discharge Provider: Dariel Winston MD    Diagnoses:  Active Hospital Problems    Diagnosis  POA    *BPH with urinary obstruction [N40.1, N13.8]  Yes      Resolved Hospital Problems    Diagnosis Date Resolved POA   No resolved problems to display.       Discharged Condition: good    Hospital Course: Patient was admitted for an outpatient laser TURP and tolerated the procedure well with no complications.    Final Diagnoses: Same as principal problem.    Disposition: Home or Self Care    Follow up/Patient Instructions:    Medications:  Reconciled Home Medications:   Current Discharge Medication List      START taking these medications    Details   oxyCODONE-acetaminophen (PERCOCET) 5-325 mg per tablet Take 1 tablet by mouth every 4 (four) hours as needed for Pain.  Qty: 21 tablet, Refills: 0         CONTINUE these medications which have NOT CHANGED    Details   RAPAFLO 8 mg Cap capsule Take 1 capsule (8 mg total) by mouth every evening.  Qty: 30 capsule, Refills: 12      polyethylene glycol (COLYTE) 240-22.72-6.72 -5.84 gram SolR Take 4,000 mLs (4 L total) by mouth as needed.  Qty: 1 Bottle, Refills: 0      sildenafil (VIAGRA) 100 MG tablet Take 1 tablet (100 mg total) by mouth daily as needed for Erectile Dysfunction.  Qty: 6 tablet, Refills: 12    Associated Diagnoses: ED (erectile dysfunction) of organic origin             Discharge Procedure Orders  Diet general     Activity as tolerated     Call MD for:  persistent nausea and vomiting or diarrhea     Call MD for:  severe uncontrolled pain     Call MD for:   Order Comments: Temperature > 101F       Follow-up Information     Matthew Miller MD On 12/15/2017.    Specialty:  Urology  Why:  for matos removal  Contact information:  [address] [facility] [address] [phone]                    Discharge Procedure Orders (must include Diet, Follow-up, Activity):    Discharge Procedure Orders (must include Diet, Follow-up, Activity)  Diet general     Activity as tolerated     Call MD for:  persistent nausea and vomiting or diarrhea     Call MD for:  severe uncontrolled pain     Call MD for:   Order Comments: Temperature > 101F

## 2017-12-13 NOTE — TRANSFER OF CARE
"Anesthesia Transfer of Care Note    Patient: Evan Shah    Procedure(s) Performed: Procedure(s) (LRB):  PROSTATECTOMY-TRANSURETHRAL W QUANTA LASER (N/A)    Patient location: PACU    Anesthesia Type: general    Transport from OR: Transported from OR on 6-10 L/min O2 by face mask with adequate spontaneous ventilation    Post pain: adequate analgesia    Post assessment: no apparent anesthetic complications    Post vital signs: stable    Level of consciousness: sedated    Nausea/Vomiting: no nausea/vomiting    Complications: none    Transfer of care protocol was followed      Last vitals:   Visit Vitals  /69 (BP Location: Left arm, Patient Position: Lying)   Pulse (!) 53   Temp 37 °C (98.6 °F) (Temporal)   Resp 16   Ht 5' 4" (1.626 m)   Wt 65.3 kg (144 lb)   SpO2 99%   BMI 24.72 kg/m²     "

## 2017-12-13 NOTE — PROGRESS NOTES
Plan of care reviewed with pt, he verbalized understanding, pt progressing with plan of care, denies nausea, pain tolerable, tolerating PO, reviewed all DC instructions with his niece who speaks English via phone and with patient using Setswana  on language line. Reviewed meds, scripts, how to switch matos leg bag to standard drainage bag. When to call MD, when to follow-up, answered questions.

## 2017-12-13 NOTE — ANESTHESIA RELEASE NOTE
Anesthesia Release from PACU Note    Patient: Evan Shah    Procedure(s) Performed: Procedure(s) (LRB):  PROSTATECTOMY-TRANSURETHRAL W QUANTA LASER (N/A)    Anesthesia type: general    Post pain: Adequate analgesia    Post assessment: no apparent anesthetic complications    Last Vitals:   Vitals:    12/13/17 1200   BP: (!) 106/58   Pulse: 76   Resp: 16   Temp:    SpO2: 99%       Post vital signs: stable    Level of consciousness: awake    Complications: PONV    Airway Patency: patent    Respiratory: unassisted    Cardiovascular: stable and blood pressure at baseline    Hydration: euvolemic

## 2017-12-13 NOTE — OP NOTE
[facility]  Operative Note    Date: 12/13/2017    Pre-Op Diagnosis: BPH, LUTS   Patient Active Problem List   Diagnosis    Tobacco abuse    BPH with urinary obstruction    ED (erectile dysfunction) of organic origin    Chronic prostatitis       Post-Op Diagnosis: same    Procedure(s) Performed:   1.  Laser vaporization of the prostate    Specimen(s): none    Staff Surgeon: Matthew Miller MD    Assistant Surgeon: Dariel Winston MD; Maggie Smith MD    Anesthesia: General endotracheal anesthesia    Indications: Evan Shah is a 62 y.o. male with BPH and LUTS refractory to medical management. He would like definitive surgical correction.     Findings:   - Trilobar hypertrophy present.   - Vaporized prostate with laser. Wide open channel at procedure's end.     Estimated Blood Loss: min    Drains: 24 Fr 2-way matos catheter    Procedure in Detail:  After risks, benefits and possible complications of Laser TURP were discussed, the patient elected to undergo the procedure and informed consent was obtained. All questions were answered in the marilyn-operative area. The patient was transferred to the cystoscopy suite and placed on the fluoroscopy table in the supine position. Anesthesia was administered.  When the patient was adequately sedated he was placed in the dorsal lithotomy position and prepped and draped in the usual sterile fashion.  Time out was performed, marilyn-procedural antibiotics were confirmed.      A 22 [place] revolix laser scope was introduced into the bladder per urethra. Trlobar hyperplasia was seen. Formal cystoscopy was performed which revealed no tumors or lesions suspicious for malignancy, no bladder stones, no bladder diverticuli, and no trabeculations.  The right and left ureteral orifices were visualized in the normal anatomic position and clear efflux of urine seen bilaterally.     Our attention was first turned the median lobe of the prostate. We began with a 800 micro Quanta  laser fiber set at 150 edgar. An incision in the prostate was made with the laser fiber at the bladder neck at the 5 o'clock position and brought just proximal to the verumontanum to the depth of the prostatic capsule. A right counter incision was made in the prostate at the 7 o'clock position and brought to just proximal of the verumontanum. The median lobe was then vaporized systematically between the two previously mentioned counterincisions.     Next lateral incisions were made from the 2 o'clock position and brought down to the proximal verumontanum to the level of the prostatic capsule. The lateral lobe was then vaporized superficially to deep in a stepwise fashion. This was repeated from the 10' o'clock position to just proximal to the veru. After all hyperplasia had been vaporized the bladder was drained. A good channel was seen. All bleeding was coagulated with the quanta laser and adequate hemostasis was obtained.        The patient tolerated the procedure well and was transferred to the recovery room in stable condition.      Follow up care:  The patient will follow up with Dr. Miller in two days for matos catheter removal.  He was given prescriptions for miralax and percocet.     Dariel Winston MD

## 2017-12-15 ENCOUNTER — OFFICE VISIT (OUTPATIENT)
Dept: UROLOGY | Facility: CLINIC | Age: 62
End: 2017-12-15
Payer: COMMERCIAL

## 2017-12-15 DIAGNOSIS — K59.03 DRUG-INDUCED CONSTIPATION: ICD-10-CM

## 2017-12-15 DIAGNOSIS — Z90.79 S/P TURP: ICD-10-CM

## 2017-12-15 PROCEDURE — 99024 POSTOP FOLLOW-UP VISIT: CPT | Mod: S$GLB,,, | Performed by: UROLOGY

## 2017-12-15 PROCEDURE — 99999 PR PBB SHADOW E&M-EST. PATIENT-LVL II: CPT | Mod: PBBFAC,,, | Performed by: UROLOGY

## 2017-12-15 RX ORDER — ADHESIVE BANDAGE
30 BANDAGE TOPICAL
Qty: 1 BOTTLE | Refills: 0 | Status: SHIPPED | OUTPATIENT
Start: 2017-12-15

## 2017-12-15 NOTE — PROGRESS NOTES
CC; s/p laser TURP    Evan Son is a 62 y.o. man who is here for the evaluation of Post-op Evaluation (here for voiding trial-speaks koran. patient will talk to dr. hall )  Here for voiding trial following laser TURP on 12/13/17.  He is a Occitan with limited English speaking.  Hx of prostatitis back in 2009.  Since then, he often has experienced spraying or splitting urine flow.  Has been taking Flomax for the past 2 years.  C/o intermittency, weak urine flow, incomplete bladder emptying.    No family hx of prostate cancer.  Denies flank pain, dysuria, hematuria .      Past Medical History:   Diagnosis Date    BPH with obstruction/lower urinary tract symptoms     ED (erectile dysfunction)     Smoker      No past surgical history on file.  Social History   Substance Use Topics    Smoking status: Current Every Day Smoker     Packs/day: 0.75     Types: Cigarettes    Smokeless tobacco: Never Used    Alcohol use No     No family history on file.  Allergy:  Review of patient's allergies indicates:  No Known Allergies  Outpatient Encounter Prescriptions as of 12/15/2017   Medication Sig Dispense Refill    magnesium hydroxide 400 mg/5 ml 400 mg/5 mL Susp Take 30 mLs (2,400 mg total) by mouth every 6 to 8 hours as needed. 1 Bottle 0    oxyCODONE-acetaminophen (PERCOCET) 5-325 mg per tablet Take 1 tablet by mouth every 4 (four) hours as needed for Pain. 21 tablet 0    polyethylene glycol (COLYTE) 240-22.72-6.72 -5.84 gram SolR Take 4,000 mLs (4 L total) by mouth as needed. 1 Bottle 0    RAPAFLO 8 mg Cap capsule Take 1 capsule (8 mg total) by mouth every evening. 30 capsule 12    sildenafil (VIAGRA) 100 MG tablet Take 1 tablet (100 mg total) by mouth daily as needed for Erectile Dysfunction. 6 tablet 12     No facility-administered encounter medications on file as of 12/15/2017.      Review of Systems   General ROS: GENERAL:  No weight gain or loss  SKIN:  No rashes or lacerations  HEAD:  No headaches  EYES:  No  exophthalmos, jaundice or blindness  EARS:  No dizziness, tinnitus or hearing loss  NOSE:  No changes in smell  MOUTH & THROAT:  No dyskinetic movements or obvious goiter  CHEST:  No shortness of breath, hyperventilation or cough  CARDIOVASCULAR:  No tachycardia or chest pain  ABDOMEN:  No nausea, vomiting, pain, constipation or diarrhea  URINARY:  No frequency, dysuria or sexual dysfunction  ENDOCRINE:  No polydipsia, polyuria  MUSCULOSKELETAL:  No pain or stiffness of the joints  NEUROLOGIC:  No weakness, sensory changes, seizures, confusion, memory loss, tremor or other abnormal movements  Physical Exam     There were no vitals filed for this visit.  Physical Exam  Genitalia:  Scrotum: no rash or lesion  Normal symmetric epididymis without masses  Normal vas palpated  Normal size, symmetric testicles with no masses   Normal urethral meatus with no discharge  Normal circumcised penis with no lesion   Rectal:  Normal perineum and anus upon inspection.  Normal tone, no masses or tenderness;     LABS:  Lab Results   Component Value Date    PSA 2.3 07/18/2017     No results found for this or any previous visit.  Lab Results   Component Value Date    CREATININE 0.8 12/06/2017    CREATININE 0.9 07/18/2017     No results found for this or any previous visit.  Urine Culture, Routine   Date Value Ref Range Status   10/02/2017 No growth  Final     UA clear    Voiding trial  Voiding Trial is done.  200 cc sterile water instilled in the bladder.  Vasquez catheter removed.  Patient was able to void spontaneously.  His voided urine was bloody.    Assessment and Plan:  Evan was seen today for post-op evaluation.    Diagnoses and all orders for this visit:    S/P TURP    Drug-induced constipation  -     magnesium hydroxide 400 mg/5 ml 400 mg/5 mL Susp; Take 30 mLs (2,400 mg total) by mouth every 6 to 8 hours as needed.    try MOM for constipation.  Drink plenty of water today.    Follow-up:  Return in about 3 months (around  3/15/2018).

## 2017-12-27 ENCOUNTER — TELEPHONE (OUTPATIENT)
Dept: UROLOGY | Facility: CLINIC | Age: 62
End: 2017-12-27

## 2017-12-27 DIAGNOSIS — Z90.79 S/P TURP: Primary | ICD-10-CM

## 2017-12-27 DIAGNOSIS — N31.8 FREQUENCY-URGENCY SYNDROME: ICD-10-CM

## 2017-12-27 RX ORDER — CIPROFLOXACIN 500 MG/1
500 TABLET ORAL 2 TIMES DAILY
Qty: 14 TABLET | Refills: 0 | Status: SHIPPED | OUTPATIENT
Start: 2017-12-27 | End: 2018-01-03

## 2017-12-27 RX ORDER — OXYBUTYNIN CHLORIDE 5 MG/1
5 TABLET ORAL 2 TIMES DAILY
Qty: 60 TABLET | Refills: 12 | Status: SHIPPED | OUTPATIENT
Start: 2017-12-27 | End: 2018-02-16

## 2017-12-27 RX ORDER — PHENAZOPYRIDINE HYDROCHLORIDE 200 MG/1
200 TABLET, FILM COATED ORAL 3 TIMES DAILY PRN
Qty: 30 TABLET | Refills: 1 | Status: SHIPPED | OUTPATIENT
Start: 2017-12-27 | End: 2018-01-06

## 2017-12-28 NOTE — TELEPHONE ENCOUNTER
Diagnoses and all orders for this visit:    S/P TURP  -     ciprofloxacin HCl (CIPRO) 500 MG tablet; Take 1 tablet (500 mg total) by mouth 2 (two) times daily.    Frequency-urgency syndrome  -     oxybutynin (DITROPAN) 5 MG Tab; Take 1 tablet (5 mg total) by mouth 2 (two) times daily.  -     phenazopyridine (PYRIDIUM) 200 MG tablet; Take 1 tablet (200 mg total) by mouth 3 (three) times daily as needed for Pain.    he can hold off Azo and use Pyridium instead.

## 2018-01-02 ENCOUNTER — NURSE TRIAGE (OUTPATIENT)
Dept: ADMINISTRATIVE | Facility: CLINIC | Age: 63
End: 2018-01-02

## 2018-01-02 NOTE — TELEPHONE ENCOUNTER
Reason for Disposition   Caller has NON-URGENT question and triager unable to answer question    Protocols used: ST POST-OP SYMPTOMS AND [id number]-B-AM    Karen is calling to report Evan continues with urinary frequency with occasional burning.  States Evan reports he does not feel like he is emptying bladder all the  Way.  Please contact Evan or Cecille to advise.

## 2018-01-16 ENCOUNTER — TELEPHONE (OUTPATIENT)
Dept: UROLOGY | Facility: CLINIC | Age: 63
End: 2018-01-16

## 2018-01-16 NOTE — TELEPHONE ENCOUNTER
Kegel exercise.  Measure his each voided volume and total volume at night.  Will see him in 1 month for follow up after laser TURP.

## 2018-02-16 ENCOUNTER — OFFICE VISIT (OUTPATIENT)
Dept: UROLOGY | Facility: CLINIC | Age: 63
End: 2018-02-16
Payer: COMMERCIAL

## 2018-02-16 VITALS — RESPIRATION RATE: 18 BRPM | WEIGHT: 143.75 LBS | BODY MASS INDEX: 23.95 KG/M2 | HEIGHT: 65 IN

## 2018-02-16 DIAGNOSIS — R35.0 FREQUENCY OF URINATION: ICD-10-CM

## 2018-02-16 DIAGNOSIS — R30.0 DYSURIA: ICD-10-CM

## 2018-02-16 DIAGNOSIS — Z90.79 S/P TURP: Primary | ICD-10-CM

## 2018-02-16 DIAGNOSIS — N13.8 BPH WITH URINARY OBSTRUCTION: ICD-10-CM

## 2018-02-16 DIAGNOSIS — N40.1 BPH WITH URINARY OBSTRUCTION: ICD-10-CM

## 2018-02-16 DIAGNOSIS — N41.1 CHRONIC PROSTATITIS: ICD-10-CM

## 2018-02-16 LAB
BILIRUB SERPL-MCNC: NORMAL MG/DL
BLOOD URINE, POC: NORMAL
COLOR, POC UA: YELLOW
GLUCOSE UR QL STRIP: NORMAL
KETONES UR QL STRIP: NORMAL
LEUKOCYTE ESTERASE URINE, POC: NORMAL
NITRITE, POC UA: NORMAL
PH, POC UA: 7
PROTEIN, POC: NORMAL
SPECIFIC GRAVITY, POC UA: 1
UROBILINOGEN, POC UA: NORMAL

## 2018-02-16 PROCEDURE — 87088 URINE BACTERIA CULTURE: CPT

## 2018-02-16 PROCEDURE — 87186 SC STD MICRODIL/AGAR DIL: CPT

## 2018-02-16 PROCEDURE — 99999 PR PBB SHADOW E&M-EST. PATIENT-LVL III: CPT | Mod: PBBFAC,,, | Performed by: UROLOGY

## 2018-02-16 PROCEDURE — 3008F BODY MASS INDEX DOCD: CPT | Mod: S$GLB,,, | Performed by: UROLOGY

## 2018-02-16 PROCEDURE — 99214 OFFICE O/P EST MOD 30 MIN: CPT | Mod: 24,25,S$GLB, | Performed by: UROLOGY

## 2018-02-16 PROCEDURE — 87086 URINE CULTURE/COLONY COUNT: CPT

## 2018-02-16 PROCEDURE — 87077 CULTURE AEROBIC IDENTIFY: CPT

## 2018-02-16 PROCEDURE — 81002 URINALYSIS NONAUTO W/O SCOPE: CPT | Mod: S$GLB,,, | Performed by: UROLOGY

## 2018-02-16 RX ORDER — TROSPIUM CHLORIDE 20 MG/1
20 TABLET, FILM COATED ORAL 2 TIMES DAILY
Qty: 60 TABLET | Refills: 11 | Status: SHIPPED | OUTPATIENT
Start: 2018-02-16 | End: 2018-03-08

## 2018-02-16 RX ORDER — LEVOFLOXACIN 500 MG/1
500 TABLET, FILM COATED ORAL DAILY
Qty: 30 TABLET | Refills: 1 | Status: SHIPPED | OUTPATIENT
Start: 2018-02-16 | End: 2018-02-26

## 2018-02-16 NOTE — PROGRESS NOTES
CC; s/p laser TURP     Evan Shah is a 62 y.o. man who is here for the evaluation of frequency, urgency, urge incontinence with dysuria.  Here for voiding trial following laser TURP on 12/13/17.  He is a [place] with limited English speaking.  Hx of prostatitis back in 2009.  He reports that he is voiding well, but still experiences frequency every 1 hour, with sudden urge and occasional urge incontinence associated with dysura.  When he tries to hold urine, he feels strange sensation to the right side cheek.  Nocturia 2 to 3 x.  No other neurologic issues reported.     No family hx of prostate cancer.  Denies flank pain, dysuria, hematuria .            Past Medical History:   Diagnosis Date    BPH with obstruction/lower urinary tract symptoms      ED (erectile dysfunction)      Smoker        No past surgical history on file.        Social History   Substance Use Topics    Smoking status: Current Every Day Smoker       Packs/day: 0.75       Types: Cigarettes    Smokeless tobacco: Never Used    Alcohol use No      No family history on file.  Allergy:  Review of patient's allergies indicates:  No Known Allergies  Encounter Medications   Outpatient Encounter Prescriptions as of 12/15/2017   Medication Sig Dispense Refill    magnesium hydroxide 400 mg/5 ml 400 mg/5 mL Susp Take 30 mLs (2,400 mg total) by mouth every 6 to 8 hours as needed. 1 Bottle 0    oxyCODONE-acetaminophen (PERCOCET) 5-325 mg per tablet Take 1 tablet by mouth every 4 (four) hours as needed for Pain. 21 tablet 0    polyethylene glycol (COLYTE) 240-22.72-6.72 -5.84 gram SolR Take 4,000 mLs (4 L total) by mouth as needed. 1 Bottle 0    RAPAFLO 8 mg Cap capsule Take 1 capsule (8 mg total) by mouth every evening. 30 capsule 12    sildenafil (VIAGRA) 100 MG tablet Take 1 tablet (100 mg total) by mouth daily as needed for Erectile Dysfunction. 6 tablet 12      No facility-administered encounter medications on file as of 12/15/2017.          Review  of Systems   General ROS: GENERAL:  No weight gain or loss  SKIN:  No rashes or lacerations  HEAD:  No headaches  EYES:  No exophthalmos, jaundice or blindness  EARS:  No dizziness, tinnitus or hearing loss  NOSE:  No changes in smell  MOUTH & THROAT:  No dyskinetic movements or obvious goiter  CHEST:  No shortness of breath, hyperventilation or cough  CARDIOVASCULAR:  No tachycardia or chest pain  ABDOMEN:  No nausea, vomiting, pain, constipation or diarrhea  URINARY:  No frequency, dysuria or sexual dysfunction  ENDOCRINE:  No polydipsia, polyuria  MUSCULOSKELETAL:  No pain or stiffness of the joints  NEUROLOGIC:  No weakness, sensory changes, seizures, confusion, memory loss, tremor or other abnormal movements  Physical Exam     There were no vitals filed for this visit.  Physical Exam  Genitalia:  Scrotum: no rash or lesion  Normal symmetric epididymis without masses  Normal vas palpated  Normal size, symmetric testicles with no masses   Normal urethral meatus with no discharge  Normal circumcised penis with no lesion   Rectal:  Normal perineum and anus upon inspection.  Normal tone, no masses or tenderness;      Prostate: 25 gram smooth with no nodule, but positive tenderness, pain radiates to the tip of the penis.    LABS:        Lab Results   Component Value Date     PSA 2.3 07/18/2017      No results found for this or any previous visit.        Lab Results   Component Value Date     CREATININE 0.8 12/06/2017     CREATININE 0.9 07/18/2017      No results found for this or any previous visit.        Urine Culture, Routine   Date Value Ref Range Status   10/02/2017 No growth   Final      UA clear     Assessment and Plan:  Evan was seen today for urinary frequency.    Diagnoses and all orders for this visit:    S/P TURP    Frequency of urination  -     trospium (SANCTURA) 20 mg Tab tablet; Take 1 tablet (20 mg total) by mouth 2 (two) times daily.    Dysuria  -     methen-m.blue-s.phos-phsal-hyo (URELLE)  81-10.8-40.8 mg Tab; Take 1 tablet by mouth 3 (three) times daily as needed.    Chronic prostatitis  -     POCT urine dipstick without microscope  -     Urine culture  -     levoFLOXacin (LEVAQUIN) 500 MG tablet; Take 1 tablet (500 mg total) by mouth once daily.    BPH with urinary obstruction    suspect prostatitis.  levaquin for 1 month.  To help him with his urinary symptoms, will try urelle and sanctura.  Did not respond well to pyridium and oxybutynin.  I spent 25 minutes with the patient of which more than half was spent in direct consultation with the patient in regards to our treatment and plan.       Follow-up:  Return in about 3 months (around 3/15/2018).

## 2018-02-19 ENCOUNTER — TELEPHONE (OUTPATIENT)
Dept: UROLOGY | Facility: CLINIC | Age: 63
End: 2018-02-19

## 2018-02-19 LAB — BACTERIA UR CULT: NORMAL

## 2018-02-27 ENCOUNTER — TELEPHONE (OUTPATIENT)
Dept: UROLOGY | Facility: CLINIC | Age: 63
End: 2018-02-27

## 2018-02-28 NOTE — TELEPHONE ENCOUNTER
S/p laser TURP on 12/13/17    C/o bloating and constipation.  Told him to stop sanctura and urelle.  Continue levauqin for 1 month as given along with probiotics.    Please give him a follow up appt to see me in 1 month.

## 2018-03-01 ENCOUNTER — TELEPHONE (OUTPATIENT)
Dept: UROLOGY | Facility: CLINIC | Age: 63
End: 2018-03-01

## 2018-03-01 NOTE — TELEPHONE ENCOUNTER
----- Message from Matthew Miller MD sent at 3/1/2018  9:11 AM CST -----  Contact: Jairo lay  [phone]  Yes.  He can have an earlier appt.  ----- Message -----  From: Cristal August LPN  Sent: 3/1/2018   9:00 AM  To: Matthew Miller MD    Had turp 12.13.2017, it has a 90 day global period and 3/20 will be past the 90 days. Do you want me to move perico't date sooner?  ----- Message -----  From: Kat Edwards MA  Sent: 2/28/2018   2:18 PM  To: Paul CURRY Staff    Knows you will call on Thursday, 3-1-18.    States she is calling regarding the appointment that is scheduled on Tuesday, 3-20-18 and is asking if the appointment type can be changed to a post op due to his high deductible and she states he is with complications from the surgery.

## 2018-03-08 ENCOUNTER — OFFICE VISIT (OUTPATIENT)
Dept: UROLOGY | Facility: CLINIC | Age: 63
End: 2018-03-08
Payer: COMMERCIAL

## 2018-03-08 VITALS — BODY MASS INDEX: 24.41 KG/M2 | WEIGHT: 143 LBS | HEIGHT: 64 IN

## 2018-03-08 DIAGNOSIS — Z90.79 S/P TURP: ICD-10-CM

## 2018-03-08 DIAGNOSIS — N41.1 CHRONIC PROSTATITIS: Primary | ICD-10-CM

## 2018-03-08 PROCEDURE — 99499 UNLISTED E&M SERVICE: CPT | Mod: S$GLB,,, | Performed by: UROLOGY

## 2018-03-08 PROCEDURE — 99999 PR PBB SHADOW E&M-EST. PATIENT-LVL III: CPT | Mod: PBBFAC,,, | Performed by: UROLOGY

## 2018-03-08 PROCEDURE — 87086 URINE CULTURE/COLONY COUNT: CPT

## 2018-03-08 PROCEDURE — 81000 URINALYSIS NONAUTO W/SCOPE: CPT | Mod: S$GLB,,, | Performed by: UROLOGY

## 2018-03-08 RX ORDER — DOXYCYCLINE 100 MG/1
100 CAPSULE ORAL 2 TIMES DAILY
Qty: 60 CAPSULE | Refills: 0 | Status: SHIPPED | OUTPATIENT
Start: 2018-03-08 | End: 2018-04-07

## 2018-03-08 NOTE — PROGRESS NOTES
CC; s/p laser TURP, persistent frequency     Evan Son is a 62 y.o. man who is here for the evaluation of frequency, urgency, urge incontinence with dysuria.  Here for voiding trial following laser TURP on 12/13/17.  He is a [place] with limited English speaking.  Hx of prostatitis back in 2009.  Prior hx of STD as a young man.    He reports that he is voiding well, but still experiences frequency every 1 hour, with sudden urge and occasional urge incontinence associated with dysura.  Since he has taken levaquin, he feels better but not completely improved to his satisfaction.  Taking Trospium and urelle resulted in severe constipation.    Nocturia 2 to 3 x.  No other neurologic issues reported.     No family hx of prostate cancer.  Denies flank pain, dysuria, hematuria .            Past Medical History:   Diagnosis Date    BPH with obstruction/lower urinary tract symptoms      ED (erectile dysfunction)      Smoker        No past surgical history on file.        Social History   Substance Use Topics    Smoking status: Current Every Day Smoker       Packs/day: 0.75       Types: Cigarettes    Smokeless tobacco: Never Used    Alcohol use No      No family history on file.  Allergy:  Review of patient's allergies indicates:  No Known Allergies  Encounter Medications          Outpatient Encounter Prescriptions as of 12/15/2017   Medication Sig Dispense Refill    magnesium hydroxide 400 mg/5 ml 400 mg/5 mL Susp Take 30 mLs (2,400 mg total) by mouth every 6 to 8 hours as needed. 1 Bottle 0    oxyCODONE-acetaminophen (PERCOCET) 5-325 mg per tablet Take 1 tablet by mouth every 4 (four) hours as needed for Pain. 21 tablet 0    polyethylene glycol (COLYTE) 240-22.72-6.72 -5.84 gram SolR Take 4,000 mLs (4 L total) by mouth as needed. 1 Bottle 0    RAPAFLO 8 mg Cap capsule Take 1 capsule (8 mg total) by mouth every evening. 30 capsule 12    sildenafil (VIAGRA) 100 MG tablet Take 1 tablet (100 mg total) by mouth daily  as needed for Erectile Dysfunction. 6 tablet 12      No facility-administered encounter medications on file as of 12/15/2017.          Review of Systems   General ROS: GENERAL:  No weight gain or loss  SKIN:  No rashes or lacerations  HEAD:  No headaches  EYES:  No exophthalmos, jaundice or blindness  EARS:  No dizziness, tinnitus or hearing loss  NOSE:  No changes in smell  MOUTH & THROAT:  No dyskinetic movements or obvious goiter  CHEST:  No shortness of breath, hyperventilation or cough  CARDIOVASCULAR:  No tachycardia or chest pain  ABDOMEN:  No nausea, vomiting, pain, constipation or diarrhea  URINARY:  No frequency, dysuria or sexual dysfunction  ENDOCRINE:  No polydipsia, polyuria  MUSCULOSKELETAL:  No pain or stiffness of the joints  NEUROLOGIC:  No weakness, sensory changes, seizures, confusion, memory loss, tremor or other abnormal movements  Physical Exam     There were no vitals filed for this visit.  Physical Exam  Genitalia:  Scrotum: no rash or lesion  Normal symmetric epididymis without masses  Normal vas palpated  Normal size, symmetric testicles with no masses   Normal urethral meatus with no discharge  Normal circumcised penis with no lesion   Rectal:  Normal perineum and anus upon inspection.  Normal tone, no masses or tenderness;      Prostate: 25 gram smooth with no nodule, but positive tenderness, pain radiates to the tip of the penis.  EPS revealed very thick semen fluid with many WBCs on high power field    LABS:        Lab Results   Component Value Date     PSA 2.3 07/18/2017      No results found for this or any previous visit.        Lab Results   Component Value Date     CREATININE 0.8 12/06/2017     CREATININE 0.9 07/18/2017      No results found for this or any previous visit.        Urine Culture, Routine   Date Value Ref Range Status   10/02/2017 No growth   Final      UA clear    Positive EPS     Assessment and Plan:  Evan was seen today for post-op evaluation.    Diagnoses and  all orders for this visit:    Chronic prostatitis  -     doxycycline (VIBRAMYCIN) 100 MG Cap; Take 1 capsule (100 mg total) by mouth 2 (two) times daily.  -     POCT urine dipstick without microscope  -     Urine culture    S/P TURP    suspect prostatitis.  Will switch levaquin to doxycycline for 1 month in light of prior hx of STD.    I spent 25 minutes with the patient of which more than half was spent in direct consultation with the patient in regards to our treatment and plan.       Follow-up:  Follow-up in about 6 months (around 9/8/2018).

## 2018-03-09 LAB — BACTERIA UR CULT: NO GROWTH

## 2018-04-09 ENCOUNTER — TELEPHONE (OUTPATIENT)
Dept: UROLOGY | Facility: CLINIC | Age: 63
End: 2018-04-09

## 2018-04-09 DIAGNOSIS — N41.1 PROSTATITIS, CHRONIC: Primary | ICD-10-CM

## 2018-04-09 DIAGNOSIS — N31.8 FREQUENCY-URGENCY SYNDROME: ICD-10-CM

## 2018-04-09 RX ORDER — DOXYCYCLINE 100 MG/1
100 CAPSULE ORAL 2 TIMES DAILY
Qty: 30 CAPSULE | Refills: 1 | Status: SHIPPED | OUTPATIENT
Start: 2018-04-09 | End: 2018-04-24

## 2018-04-09 RX ORDER — TOLTERODINE 4 MG/1
4 CAPSULE, EXTENDED RELEASE ORAL DAILY
Qty: 30 CAPSULE | Refills: 11 | Status: SHIPPED | OUTPATIENT
Start: 2018-04-09 | End: 2019-04-09

## 2018-04-09 NOTE — TELEPHONE ENCOUNTER
Diagnoses and all orders for this visit:    Prostatitis, chronic  -     doxycycline (VIBRAMYCIN) 100 MG Cap; Take 1 capsule (100 mg total) by mouth 2 (two) times daily.    Frequency-urgency syndrome  -     tolterodine (DETROL LA) 4 MG 24 hr capsule; Take 1 capsule (4 mg total) by mouth once daily.    will see him in 3 months for follow up.

## (undated) DEVICE — TRAY CYSTO BASIN

## (undated) DEVICE — SET TUR BLADDER IRRIG Y TYPE

## (undated) DEVICE — GOWN SMARTGOWN LVL4 X-LONG XL

## (undated) DEVICE — SEE L#95700

## (undated) DEVICE — Device

## (undated) DEVICE — SOL IRR NACL .9% 3000ML

## (undated) DEVICE — SYR 50ML CATH TIP

## (undated) DEVICE — SYR 10CC LUER LOCK

## (undated) DEVICE — PACK CYSTO